# Patient Record
Sex: FEMALE | Race: WHITE | NOT HISPANIC OR LATINO | Employment: FULL TIME | URBAN - NONMETROPOLITAN AREA
[De-identification: names, ages, dates, MRNs, and addresses within clinical notes are randomized per-mention and may not be internally consistent; named-entity substitution may affect disease eponyms.]

---

## 2019-10-08 ENCOUNTER — PROCEDURE VISIT (OUTPATIENT)
Dept: OTOLARYNGOLOGY | Facility: CLINIC | Age: 54
End: 2019-10-08

## 2019-10-08 DIAGNOSIS — H93.8X3 EAR FULLNESS, BILATERAL: Primary | ICD-10-CM

## 2019-10-22 PROBLEM — H69.83 DYSFUNCTION OF BOTH EUSTACHIAN TUBES: Status: ACTIVE | Noted: 2019-10-22

## 2019-10-22 PROBLEM — J34.3 HYPERTROPHY OF INFERIOR NASAL TURBINATE: Status: ACTIVE | Noted: 2019-10-22

## 2019-10-22 PROBLEM — J32.9 CHRONIC SINUSITIS: Status: ACTIVE | Noted: 2019-10-22

## 2019-10-22 PROBLEM — J31.0 CHRONIC RHINITIS: Status: ACTIVE | Noted: 2019-10-22

## 2019-11-06 ENCOUNTER — APPOINTMENT (OUTPATIENT)
Dept: CT IMAGING | Facility: HOSPITAL | Age: 54
End: 2019-11-06

## 2019-11-25 ENCOUNTER — OFFICE VISIT (OUTPATIENT)
Dept: OTOLARYNGOLOGY | Facility: CLINIC | Age: 54
End: 2019-11-25

## 2019-11-25 ENCOUNTER — HOSPITAL ENCOUNTER (OUTPATIENT)
Dept: CT IMAGING | Facility: HOSPITAL | Age: 54
Discharge: HOME | End: 2019-11-25
Admitting: NURSE PRACTITIONER

## 2019-11-25 VITALS
TEMPERATURE: 98.2 F | HEART RATE: 73 BPM | DIASTOLIC BLOOD PRESSURE: 76 MMHG | BODY MASS INDEX: 26.49 KG/M2 | WEIGHT: 168.8 LBS | HEIGHT: 67 IN | SYSTOLIC BLOOD PRESSURE: 115 MMHG

## 2019-11-25 DIAGNOSIS — J32.9 CHRONIC SINUSITIS, UNSPECIFIED LOCATION: ICD-10-CM

## 2019-11-25 DIAGNOSIS — J30.9 ALLERGIC RHINITIS, UNSPECIFIED SEASONALITY, UNSPECIFIED TRIGGER: ICD-10-CM

## 2019-11-25 DIAGNOSIS — H69.83 EUSTACHIAN TUBE DYSFUNCTION, BILATERAL: Primary | ICD-10-CM

## 2019-11-25 DIAGNOSIS — J31.0 CHRONIC RHINITIS: ICD-10-CM

## 2019-11-25 DIAGNOSIS — J34.3 HYPERTROPHY OF INFERIOR NASAL TURBINATE: ICD-10-CM

## 2019-11-25 PROCEDURE — 99214 OFFICE O/P EST MOD 30 MIN: CPT | Performed by: OTOLARYNGOLOGY

## 2019-11-25 PROCEDURE — 70486 CT MAXILLOFACIAL W/O DYE: CPT

## 2019-11-25 RX ORDER — METHYLPREDNISOLONE SODIUM SUCCINATE 125 MG/2ML
125 INJECTION, POWDER, LYOPHILIZED, FOR SOLUTION INTRAMUSCULAR; INTRAVENOUS
COMMUNITY
End: 2019-12-09 | Stop reason: ALTCHOICE

## 2019-11-25 RX ORDER — EPINEPHRINE 0.3 MG/.3ML
INJECTION SUBCUTANEOUS
COMMUNITY

## 2019-11-25 NOTE — PROGRESS NOTES
Elizabeth Spencer MA   Patient Intake Note    Review of Systems  Review of Systems   Constitutional: Negative for chills, fatigue and fever.   HENT:        See HPI   Respiratory: Negative for cough, choking, shortness of breath and wheezing.    Gastrointestinal: Negative for diarrhea, nausea and vomiting.   Neurological: Positive for headaches. Negative for dizziness, weakness and light-headedness.   Psychiatric/Behavioral: Negative for sleep disturbance.       QUALITY MEASURES    Tobacco Use: Screening and Cessation Intervention  Social History    Tobacco Use      Smoking status: Never Smoker      Smokeless tobacco: Never Used        Elizabeth Spencer MA  11/25/2019  9:31 AM

## 2019-11-25 NOTE — PROGRESS NOTES
Earl Farooq MD     Chief Complaint   Patient presents with   • Follow-up        History of Present Illness  Elli Ryder is a  54 y.o. female who is here for follow up. She was last seen in the office on 10/8/2019 by: Mee OLIVAS with: ear fullness and ear pressure, repeated sinusitis. The patient was treated with: steroids and Augmentin     Review of Systems  Reviewed per patient intake note    Past History:  Past medical and surgical history, family history and social history reviewed and updated when appropriate.  Current medications and allergies reviewed and updated when appropriate.  Allergies:  Iodine; Meperidine; Other; and Peanut oil        Vital Signs:   Temp:  [98.2 °F (36.8 °C)] 98.2 °F (36.8 °C)  Heart Rate:  [73] 73  BP: (115)/(76) 115/76    Physical Exam   CONSTITUTIONAL: well nourished, well-developed, alert, oriented, in no acute distress   COMMUNICATION AND VOICE: able to communicate normally, normal voice quality  HEAD: normocephalic, no lesions, atraumatic, no tenderness, no masses   FACE: appearance normal, no lesions, no tenderness, no deformities, facial motion symmetric  EYES: ocular motility normal, eyelids normal, orbits normal, no proptosis, conjunctiva normal , pupils equal, round  HEARING: response to conversational voice normal bilaterally   EXTERNAL EARS: auricles without lesions  EXTERNAL NOSE: structure normal, no tenderness on palpation, no nasal discharge, no lesions, no evidence of trauma, nostrils patent  INTRANASAL EXAM: nasal mucosa with mucosal congestion and erythema, nasal septum without overt anterior deviation  LIPS: structure normal, no tenderness on palpation, no lesions, no evidence of trauma  NECK: neck appearance normal  LYMPH NODES: no lymphadenopathy  CHEST/RESPIRATORY: respiratory effort normal  CARDIOVASCULAR: extremities without cyanosis or edema, no overt jugulovenous distension present  NEUROLOGIC/PSYCHIATRIC: oriented appropriately for age, mood  normal, affect appropriate, cranial nerves intact grossly unless specifically mentioned above     RESULTS REVIEW:    I have personally reviewed the patient's ct scan of the sinuses without contrast images.  There is inflammatory change without overt sinusitis.  There is turbinate hypertrophy.     Assessment   1. Eustachian tube dysfunction, bilateral    2. Allergic rhinitis, unspecified seasonality, unspecified trigger        Plan       Hold antihistamine containing medications (prescribed and over the counter) 1 week prior to the scheduled allergy testing.         Orders Placed This Encounter   Procedures   • Intradermal Allergy Testing   • Prick Testing (multi-Test)       Return in about 6 weeks (around 1/6/2020) for follow up after testing completed.    Earl Farooq MD  11/25/19  1:21 PM

## 2019-11-25 NOTE — PATIENT INSTRUCTIONS
Hold antihistamine containing medications (prescribed and over the counter) 1 week prior to the scheduled allergy testing.   *IT IS THE RESPONSIBILITY OF THE PATIENT TO CONTACT YOUR INSURANCE COMPANY FOR INFORMATION REGARDING PRIOR AUTHORIZATION, PAYMENT PRACTICES, AND COVERAGES. *  (The following codes will help with questions when calling your insurance company)  66606 (Allergy Nurse/Tech Office Visit)  95004 x 23 units (Skin Prick Test)  95024 x 23 units (Intra-Dermal Test)   Diagnosis Code 477.9    INSTRUCTIONS FOR ALLERGY TESTING    If you take Beta Blockers (see attached list) you will not be able to have testing or any type of immunotherapy unless otherwise discussed with your prescribing physician and the physician ordering allergy testing.  Please discuss this with us beforehand.     We would like to review the follow rules for testing and instructions regarding any medications you may be taking.  Some medications are contraindicated when being tested and could potentially affect the results or pose an adverse effect.     *Please arrive 15 minutes prior to your scheduled testing appointment to fill out the required paper work relating to the testing.  If you are more than 15 minutes late for your scheduled testing time, you will be cancelled and rescheduled.  *Please wear a short sleeve shirt and no perfume, lotions, or cologne.  *Only the patient being testing will be allowed back to the testing area.  If the patient is under the age of 18, they must be accompanied by an adult.    Please do not take: Clarinex (Loratadine) , Claritin or Claritin-D for 7 days prior to testing.  Please do not take these medications 7 days prior to testing:   *Antihistamines or anything containing one (see attached list)   *Muscle relaxers or tranquilizers   * Antidepressants (see attached list. Check with you prescribing                     physicians before stopping any medications such as these.)   *Sedatives   *Nasal  Sprays (those containing Antihistamine: Please check list)   * Excedrin PM, or Sharonda-Damascus   *Any supplement containing Vitamin C     Medications you may continue taking up until the time of testing:              *Asthma medications (try to avoid for 6 hours prior to testing)   *Tylenol (regular or extra strength)   *Birth Control Pills or Hormones   *Diuretics (water pills)   *Plain Decongestants (those without Antihistamines)   *Sinus Rinse     The testing will take approximately 1.5 to 2 hours.  Please eat before you come for testing. There is no fasting required.  If it is necessary to cancel your scheduled testing appointment, please do so within 24 hours at the phone numbers provided.   If we are not notified within this time period, there will be a $50.00 missed appointment charge.     Beta Blockers  If you are on a beta blocker medication, you will need to talk with your primary care physician about switching to another medication 6 weeks prior to testing or allergy immunotherapy.    Beta blockers are used to treat high blood pressure, heart disease, and headaches and are often used to treat glaucoma.  Beta blockers make it more difficult to reverse a systemic reaction to allergy injection and testing.  We do not test nor treat patients on beta blockers for this reason.    If you are scheduled for allergy testing or treatment and you have a change in medications, it is important that you inform the nurse prior to your appointment. Do not discontinue the use of your beta blocker medication on your own.  Your prescribing physician can evaluate your need for the beta blocker and can safely discontinue your beta blocker or switch you to a different type of medication.  We require a written letter from your prescribing physician on his/her decision to discontinue your beta blocker before testing or treatment will be considered.     BETA BLOCKER MEDICATION LIST                      Brand Name                       Generic Name  Betapace Sotalol   Blocadren Timolol   Cartrol Carteolol   Coreg Carvedilol   Corgard Nadolol   Corzide Nadol/Bendroflunetazide   Inderal Propranolol   Inderide Propranolol/HCTZ   Kerlone Betaxolol   Lopressor Metoprolol   Normodyne Labetalol   Sectral Acebutolol   Tenoretic Atenolol/HCTZ   Tenormin Atenolol   Timolide Timolol/HCTZ   Toprol Metoprolol   Visken Pindolol   Zebeta Bisoprolol   Ziac Bisoprolol/HCTZ     EYE DROPS CONTAINING BETA BLOCKERS  Betagan Levobunolol   AK Beta Levobunolol   Betoptic Betaxolol   Optipranolol Metipranolol   Ocupress Carteolol   Timoptic Nadol/Bendroflunetazide         Antidepressants  The following antidepressants may sometimes affect the testing results and it is recommended that you discontinue those 3-4 days before testing:                              Brand Name                                                             Generic Name  Elavil Amitriptyline   Asendin Amoxapine   Anafranil Clomipramine   Norpramin Desipramine   Sinequan Doxepin   Tofranil Imipramine   Pamelor Nortriptyline   Vivactil Protriptyline   Surmontil Trimipramine   Maprotiline None Available   Remeron Mirtazapine     The following antidepressants have little or no affect on testing and you should be able to continue taking them up until the time of testing:  Desyrel Trazadone   Wellbutrin Bupropion   Effexor Venlafaxine   Serzone Nefazodone   Celexa Citalopram   Prozac Fluoxetine   Luvox Fluvoxamine   Paxil Paroxetine   Zoloft Sertraline   Nardil Phenelzine   Parnate Tranylcypromine     Antihistamines  Antihistamines should be discontinued 7 days prior to allergy testing.  The more common antihistamines are listed below.  This is not a complete list of all medications containing antihistamines.  Many over-the-counter medications contain antihistamines.  If you are uncertain as to if the medication you are taking contains an antihistamine, please check with your physician or pharmacist.  Name  Brand Antihistamines  Allegra/Allegra D Extendryl Rynatan Tylenol PM   Atarax Hycomine Compound Rynatuss Vistaril   Atrohist Kronofed Semprex Xyzal   Benedryl Nolamine Sinulin Zyrtec   Bromfed Nolohist Tavist/ Tavist D Midol PM   Clarinex Pataday Trinalin Ritalin   Claritin Patanase Tussionex Tagamet   Codimal DH Syrup Patanol Tylenol Allergy Zantac   Dimetane Periactin Tylenol Cold Antivert   Dura-Vent Phenergan Tylenol Flu Astelin/Astepro     Generic Antihistamines  Acrivastine Astemizole Meclizine   Azatadine Azelastine    Brompheniramine Cetirizine    Chlorpheniramine Cyproheptadine    Diphehydramine Fexofenadine    Hydroxyzine Loratidine    Methscopolamine Phenidamine    Promethazine Pyrilamine

## 2019-12-04 ENCOUNTER — PROCEDURE VISIT (OUTPATIENT)
Dept: OTOLARYNGOLOGY | Facility: CLINIC | Age: 54
End: 2019-12-04

## 2019-12-04 DIAGNOSIS — J30.9 ALLERGIC RHINITIS, UNSPECIFIED SEASONALITY, UNSPECIFIED TRIGGER: Primary | ICD-10-CM

## 2019-12-04 PROCEDURE — 95024 IQ TESTS W/ALLERGENIC XTRCS: CPT | Performed by: OTOLARYNGOLOGY

## 2019-12-04 PROCEDURE — 95004 PERQ TESTS W/ALRGNC XTRCS: CPT | Performed by: OTOLARYNGOLOGY

## 2019-12-04 NOTE — PROGRESS NOTES
Luis A Raines   Allergy Testing Note:    Allergy Impact:  Allergy symptom severity: mild  Allergy symptom frequency: occasional  Season allergy symptoms are worse: year round  Does allergy symptoms interfere with life?: moderately  Does allergy symptoms interfere with sleep?: a little (Increased nasal congestion making difficult to breathe.)      Allergy Symptoms:  Nasal symptoms: itching;congestion;drainage;sneezing  Ocular symptoms: itching;redness;swollen eyelids;tearing  Ear symptoms: itching;pressure;pain;fullness  Throat symptoms: dryness;sore throats;hoarseness;tonsillectomy  Mouth symptoms: dryness;itching;mouth breathing  Chest symptoms: bronchitis;cough      Environmental History:  Occupation:   Home environment: carpeting;hardwood floor;laminate floor;fireplace (Tile: Wood Burning Fireplace)  Tobacco use: none  Tobacco use: none  Animal exposures: dogs (x 4 outside)  Home heating: electric;other (comment) (Wood in the winter.)  Home cooling: central air      Allergy History:  Insect allergy reactions: bee;mosquito  Previous allergy testing?: no  Previous immunotherapy?: no  Previous treatment in ER for allergic reaction?: (!) yes (Exposure to Seafood as a teenager.)      Allergy Skin Testing:  Allergy testing was performed using the Modified Quantitative Technique. The procedure of allergy testing was explained to the patient including risks of itching burning and reactions both local and systemic. The patient understood and signed a consent. Alcohol prep was used to prepare the skin and a marking pen was used to label the Multi- Test and intradermal panels. Prick testing was performed on the forearms by using the Multitest applicator and applying gentle pressure. After 15 minutes the panels were read for reactions. Intradermal testing follow ups were then performed on the forearms. After 15 minutes, the panels were read for reactions. The results were explained to the patient and  questions answered. No complications were noted.    Allergy Testing Results:  Consent: Y    Testing location: Arm    Allergen : Skyla    Testing Nurse/Tech: Luis A CASTILLO/AT    Reviewing Physician: Earl Farooq    Testing method: Skin Testing      Endpoints: 0=negative, higher number=higher reaction:  Vial: 3= sublingual vial  Antigen Prick 5 2 EP VIAL    Histamine 7       normal controls     Glycerine Control 0          Eastern Tree Mix(T) 0      6   0        Black [place] (T) 0     6   0        [place] Grass (G) 0     6   0        Jonnie Grass (G) 5   6     4        [place] Bluegrass (G) 5   7     5        Ragweed Mix (W) 5   6     4        Common Weed (W) 5   6     4        Shaheed Weed(W) 0     6   0        Mugwort/ Geovany (W) 0     6   0        Mold Mix (M) 0      7   3        Phycomycetes (M) 0     7   3        Fusarium (M) 0     6   0        Epicoccum (M) 0     7   3        Candida (M) 5   6     4        Trichophyton (M) 5   6     4        Phoma  (M) 5   6     4        Dust Mite Mix  7   6     4        Cockroach  9       6        Dog 0     6   0        Cat 5   6     4        Horse 0     6   0      Feather 5   7     5          Luis A Raines  12/4/2019  10:42 AM

## 2019-12-05 NOTE — PROGRESS NOTES
I have reviewed the allergy testing/ mixing and or treatment notes, and procedures, I concur with her/his documentation of Elli Ryder.      Earl Farooq MD  12/05/19  7:50 AM

## 2019-12-09 ENCOUNTER — OFFICE VISIT (OUTPATIENT)
Dept: OTOLARYNGOLOGY | Facility: CLINIC | Age: 54
End: 2019-12-09

## 2019-12-09 VITALS
OXYGEN SATURATION: 97 % | DIASTOLIC BLOOD PRESSURE: 72 MMHG | HEIGHT: 67 IN | RESPIRATION RATE: 18 BRPM | HEART RATE: 72 BPM | BODY MASS INDEX: 26.84 KG/M2 | SYSTOLIC BLOOD PRESSURE: 106 MMHG | TEMPERATURE: 97.6 F | WEIGHT: 171 LBS

## 2019-12-09 DIAGNOSIS — H69.83 DYSFUNCTION OF BOTH EUSTACHIAN TUBES: Primary | ICD-10-CM

## 2019-12-09 DIAGNOSIS — J30.9 ALLERGIC RHINITIS, UNSPECIFIED SEASONALITY, UNSPECIFIED TRIGGER: ICD-10-CM

## 2019-12-09 PROCEDURE — 99214 OFFICE O/P EST MOD 30 MIN: CPT | Performed by: OTOLARYNGOLOGY

## 2019-12-09 NOTE — PROGRESS NOTES
Krystle Young MA   Patient Intake Note    Review of Systems  Review of Systems   Constitutional: Negative for chills and fever.   HENT:        See HPI   Eyes: Negative for discharge and itching.   Respiratory: Negative for cough and shortness of breath.    Gastrointestinal: Negative for diarrhea, nausea and vomiting.   Allergic/Immunologic: Positive for environmental allergies and food allergies.   Neurological: Positive for dizziness and headaches.   Psychiatric/Behavioral: Positive for sleep disturbance.   All other systems reviewed and are negative.      Tobacco Use: Screening and Cessation Intervention  Social History    Tobacco Use      Smoking status: Never Smoker      Smokeless tobacco: Never Used        Krystle Young MA  12/9/2019  2:33 PM

## 2019-12-09 NOTE — PROGRESS NOTES
Earl Farooq MD     Chief Complaint   Patient presents with   • Allergies     Follow-up from allergy testing.        History of Present Illness  Elli Ryder is a  54 y.o. female who is here for follow up. She has had positive allergy testing recently.     Allergy Impact:  Allergy symptom severity: mild  Allergy symptom frequency: occasional  Season allergy symptoms are worse: year round  Does allergy symptoms interfere with life?: moderately  Does allergy symptoms interfere with sleep?: a little (Increased nasal congestion making difficult to breathe.)      Allergy Symptoms:  Nasal symptoms: itching;congestion;drainage;sneezing  Ocular symptoms: itching;redness;swollen eyelids;tearing  Ear symptoms: itching;pressure;pain;fullness  Throat symptoms: dryness;sore throats;hoarseness;tonsillectomy  Mouth symptoms: dryness;itching;mouth breathing  Chest symptoms: bronchitis;cough      Environmental History:  Occupation:   Home environment: carpeting;hardwood floor;laminate floor;fireplace (Tile: Wood Burning Fireplace)  Tobacco use: none  Tobacco use: none  Animal exposures: dogs (x 4 outside)  Home heating: electric;other (comment) (Wood in the winter.)  Home cooling: central air      Allergy History:  Insect allergy reactions: bee;mosquito  Previous allergy testing?: no  Previous immunotherapy?: no  Previous treatment in ER for allergic reaction?: (!) yes (Exposure to Seafood as a teenager.)    Review of Systems  Reviewed per patient intake note    Past History:  Past medical and surgical history, family history and social history reviewed and updated when appropriate.  Current medications and allergies reviewed and updated when appropriate.  Allergies:  Iodine; Meperidine; Other; and Peanut oil        Vital Signs:   Temp:  [97.6 °F (36.4 °C)] 97.6 °F (36.4 °C)  Heart Rate:  [72] 72  Resp:  [18] 18  BP: (106)/(72) 106/72    Physical Exam  CONSTITUTIONAL: well nourished, well-developed,  alert, oriented, in no acute distress   COMMUNICATION AND VOICE: able to communicate normally, normal voice quality  HEAD: normocephalic, no lesions, atraumatic, no tenderness, no masses   FACE: appearance normal, no lesions, no tenderness, no deformities, facial motion symmetric  EYES: ocular motility normal, eyelids normal, orbits normal, no proptosis, conjunctiva normal , pupils equal, round  HEARING: response to conversational voice normal bilaterally   EXTERNAL EARS: auricles without lesions  EXTERNAL NOSE: structure normal, no tenderness on palpation, no nasal discharge, no lesions, no evidence of trauma, nostrils patent  INTRANASAL EXAM: nasal mucosa with mucosal congestion and erythema, nasal septum without overt anterior deviation  LIPS: structure normal, no tenderness on palpation, no lesions, no evidence of trauma  NECK: neck appearance normal  LYMPH NODES: no lymphadenopathy  CHEST/RESPIRATORY: respiratory effort normal  CARDIOVASCULAR: extremities without cyanosis or edema, no overt jugulovenous distension present  NEUROLOGIC/PSYCHIATRIC: oriented appropriately for age, mood normal, affect appropriate, cranial nerves intact grossly unless specifically mentioned above     Results Review:    Allergy Testing Results:  Consent: Y    Testing location: Arm    Allergen : Skyla    Testing Nurse/Tech: Luis A Raines /AT    Reviewing Physician: Earl Farooq    Testing method: Skin Testing      Endpoints: 0=negative, higher number=higher reaction:  Vial: 3= sublingual vial  Antigen Prick 5 2 EP VIAL    Histamine 7       normal controls     Glycerine Control 0          Eastern Tree Mix(T) 0      6   0        Black [place] (T) 0     6   0        B[place] Grass (G) 0     6   0        Jonnie Grass (G) 5   6     4        [place] Bluegrass (G) 5   7     5        Ragweed Mix (W) 5   6     4        Common Weed (W) 5   6     4        Shaheed Weed(W) 0     6   0        Mugwort/ Geovany (W) 0     6   0        Mold  Mix (M) 0      7   3        Phycomycetes (M) 0     7   3        Fusarium (M) 0     6   0        Epicoccum (M) 0     7   3        Candida (M) 5   6     4        Trichophyton (M) 5   6     4        Phoma  (M) 5   6     4        Dust Mite Mix  7   6     4        Cockroach  9       6        Dog 0     6   0        Cat 5   6     4        Horse 0     6          Feather 5   7     5             Assessment   1. Dysfunction of both eustachian tubes    2. Allergic rhinitis, unspecified seasonality, unspecified trigger        Plan       For the best response, use your nasal sprays every day without skipping doses. It may take several weeks before the full effect is acheived.   GENERAL ALLERGY AVOIDANCE: Regular vacuum cleaning is  recommended  to prevent accumulation of allergens. Use adequate filtration, including double thickness bags or HEPA filters on the  outlet. Use air-conditioning where possible. Change central air filters with an allergy rated filter on a regular basis. Install car pollen filters where possible.   DUST MITE AVOIDANCE: Use matress and pillow covers to prevent dust mite contamination. Wash bedsheets in hot water at least twice a week to prevent dust mites. Try a dehumidifier to discourage dust mite growth. Consider removing carpets and replaceing with hard wook carlos a. Remove things like drapery and upholstery if possible to prevent dust collection. Dust with a mask and a damp duster.   CAT AVOIDANCE: Consider cat removal or preventing the cat in the home (outdoor cat). Try to reduce reservoirs for cat allergens, such as carpets, sofas, drapes and blankets. Washing animals regularly removes large quantities of allergen, but needs to be repeated regularly, perhaps as often as twice weekly. Washing may be only possible with a few cats.   MOLD AVOIDANCE: Avoiding Outdoor Molds: 1) Decrease decaying vegetationand mulch near house 2) Ventilate crawl space 3) Sump pump excess water 4) Drain low lying  areas around house 5) Avoid lawnmowing or wear mask during and after mowing 6) Avoid storage areas of grains, bertrand and decaying vegetation -Avoiding Indoor Molds: 1) Keep humidity below 50% with a dehumidifier 2) Repair leaks- use fungicide 3) Clean up damp areas 4) Use mold retardant in paint, wallpaper glue, shower curtains, grout, clean regularly with a chlorine bleach solution 5) Examine houseplants for molds, especially in the soil 6) Examine basements, crawl spaces for water and damp areas. 7) Avoid carpet in the basement areas.        Immunotherapy risks and benefits were discussed with the patient/family at length including but not limited to the risk of reaction including anaphylaxis requiring hospitalization and/or death. The possibility of failure of therapy was also discussed as well as the necessity of having an epi pen with them to receive therapy every time.   Sublingual immunotherapy was discussed as an alternative. Benefits of a better safety profile, allowing for safe home use as well as decreased patient costs (gas and time savings as well as no copays etc) were discussed. Billing was discussed as well as most insurance do not cover sublingual therapy requiring out of pocket billling. Though generally safer than injections, the necessity of having an epi pen with them when they placed the drops was stressed. They were also instructed to never be alone when administering the drops. The fact that aqueous sublingual immunotherapy was not FDA approved was also discussed.   After considering the options of immunotherapy, she wishes to consider options.  We may need to consider allowing for immunotherapy through her primary care physician as a convenience.  Now, use the nasal sprays and as needed antihistamines.          Return in about 4 months (around 4/9/2020).    Earl Farooq MD  12/09/19  2:55 PM

## 2019-12-09 NOTE — PATIENT INSTRUCTIONS
For the best response, use your nasal sprays every day without skipping doses. It may take several weeks before the full effect is acheived.   GENERAL ALLERGY AVOIDANCE: Regular vacuum cleaning is  recommended  to prevent accumulation of allergens. Use adequate filtration, including double thickness bags or HEPA filters on the  outlet. Use air-conditioning where possible. Change central air filters with an allergy rated filter on a regular basis. Install car pollen filters where possible.   DUST MITE AVOIDANCE: Use matress and pillow covers to prevent dust mite contamination. Wash bedsheets in hot water at least twice a week to prevent dust mites. Try a dehumidifier to discourage dust mite growth. Consider removing carpets and replaceing with hard wook carlos a. Remove things like drapery and upholstery if possible to prevent dust collection. Dust with a mask and a damp duster.   CAT AVOIDANCE: Consider cat removal or preventing the cat in the home (outdoor cat). Try to reduce reservoirs for cat allergens, such as carpets, sofas, drapes and blankets. Washing animals regularly removes large quantities of allergen, but needs to be repeated regularly, perhaps as often as twice weekly. Washing may be only possible with a few cats.   MOLD AVOIDANCE: Avoiding Outdoor Molds: 1) Decrease decaying vegetationand mulch near house 2) Ventilate crawl space 3) Sump pump excess water 4) Drain low lying areas around house 5) Avoid lawnmowing or wear mask during and after mowing 6) Avoid storage areas of grains, bertrand and decaying vegetation -Avoiding Indoor Molds: 1) Keep humidity below 50% with a dehumidifier 2) Repair leaks- use fungicide 3) Clean up damp areas 4) Use mold retardant in paint, wallpaper glue, shower curtains, grout, clean regularly with a chlorine bleach solution 5) Examine houseplants for molds, especially in the soil 6) Examine basements, crawl spaces for water and damp areas. 7) Avoid carpet in the  basement areas.

## 2020-04-23 ENCOUNTER — TELEPHONE (OUTPATIENT)
Dept: OTOLARYNGOLOGY | Facility: CLINIC | Age: 55
End: 2020-04-23